# Patient Record
Sex: MALE | Race: WHITE
[De-identification: names, ages, dates, MRNs, and addresses within clinical notes are randomized per-mention and may not be internally consistent; named-entity substitution may affect disease eponyms.]

---

## 2020-08-21 ENCOUNTER — HOSPITAL ENCOUNTER (EMERGENCY)
Dept: HOSPITAL 74 - JERFT | Age: 65
Discharge: HOME | End: 2020-08-21
Payer: SELF-PAY

## 2020-08-21 VITALS — BODY MASS INDEX: 26.9 KG/M2

## 2020-08-21 VITALS — TEMPERATURE: 98.4 F | DIASTOLIC BLOOD PRESSURE: 91 MMHG | SYSTOLIC BLOOD PRESSURE: 147 MMHG | HEART RATE: 74 BPM

## 2020-08-21 DIAGNOSIS — S61.412A: Primary | ICD-10-CM

## 2020-08-21 PROCEDURE — 3E0234Z INTRODUCTION OF SERUM, TOXOID AND VACCINE INTO MUSCLE, PERCUTANEOUS APPROACH: ICD-10-PCS

## 2020-08-21 PROCEDURE — 0HQFXZZ REPAIR RIGHT HAND SKIN, EXTERNAL APPROACH: ICD-10-PCS

## 2020-08-21 NOTE — PDOC
History of Present Illness





- General


Chief Complaint: Laceration


Stated Complaint: RT HAND LACERATION


Time Seen by Provider: 08/21/20 14:58


History Source: Patient


Exam Limitations: Clinical Condition





- History of Present Illness


Initial Comments: 





08/21/20 16:10


Patient with past medical history of hypertension presented with complaint of 

laceration to multiple fingers of right hand status post using a wet tile saw 

and accidentally cutting left fingers.  Patient does not recall last tetanus 

vaccine.  Denies numbness or tingling sensation to the fingers.  Patient 

reported able to use and move all fingers without difficulty.  Denies any other 

symptoms





Timing/Duration: reports: just prior to arrival





Past History





- Medical History


Allergies/Adverse Reactions: 


                                    Allergies











Allergy/AdvReac Type Severity Reaction Status Date / Time


 


No Known Allergies Allergy   Verified 08/21/20 14:53











Home Medications: 


Ambulatory Orders





Amox-Tr/K Cl [Augmentin - 875Mg Tablet] 1 tab PO BID #14 tablet 08/21/20 


Ibuprofen 800 mg PO Q8H PRN #16 tablet 08/21/20 








COPD: No


CHF: No


Dementia: No


Dialysis: No





- Psycho-Social/Smoking History


Smoking History: Never smoked





- Substance Abuse Hx (Audit-C & DAST Scrn)


How often the patient has a drink containing alcohol: Never


Score: In Men: 4 or > Positive; In Women: 3 or > Positive: 0


Screen Result (Pos requires Nsg. Audit-10AR): Negative





**Review of Systems





- Review of Systems


Able to Perform ROS?: Yes


Is the patient limited English proficient: No


Constitutional: No: Chills, Fever, Malaise


HEENTM: No: Symptoms Reported, See HPI, Eye Pain, Blurred Vision, Tearing, 

Recent change in vision, Double Vision, Cataracts, Ear Pain, Ocular Prothesis, E

ar Discharge, Nose Pain, Nose Congestion, Tinnitus, Nose Bleeding, Hearing Loss,

Throat Pain, Throat Swelling, Mouth Pain, Dental Problems, Difficulty 

Swallowing, Mouth Swelling, Other


Respiratory: No: Symptoms reported, See HPI, Cough, Orthopnea, Shortness of 

Breath, SOB with Exertion, SOB at Rest, Stridor, Wheezing, Productive cough, 

Hemoptysis, Other


Cardiac (ROS): No: Symptoms Reported, See HPI, Chest Pain, Edema, Irregular 

Heart Rate, Lightheadedness, Palpitations, Syncope, Chest Tightness, Other


: No: Symptoms Reported


Musculoskeletal: Yes: Symptoms Reported, See HPI, Muscle Pain (right fingers and

hand)


Integumentary: Yes: Symptoms Reported, See HPI, Other (laceration to right hand)


Neurological: No: Tingling, Weakness


All Other Systems: Reviewed and Negative





*Physical Exam





- Vital Signs


                                Last Vital Signs











Temp Pulse Resp BP Pulse Ox


 


 98.4 F   74   18   147/91   99 


 


 08/21/20 14:54  08/21/20 14:54  08/21/20 14:54  08/21/20 14:54  08/21/20 14:54














- Physical Exam





08/21/20 16:19


GENERAL:


Well developed, well nourished. Awake and alert in mild acute distress.


NECK: 


Supple. Full ROM. 


PULMONARY: 


No evidence of respiratory distress. 


MUSCULOSKELETAL 


Normal range of motion at all joints. 


EXTREMITIES: 


 3 cm semilunar deep to subcutaneous tissue laceration to dorsal aspect of MCP 

of right little finger into webspace.  Another 3 cm linear laceration to dorsal 

aspect of proximal phalange of right ring finger with moderate bleeding.  

Another 1.5 superficial laceration to dorsal aspect of proximal phalange of 

right  middle finger with no bleeding.  Full range of motion of fingers.  Normal

sensory to all fingers.  Normal strength to all fingers.


SKIN: 


Warm and dry. Normal capillary refill. laceration to right multiple fingers


NEUROLOGICAL: 


Alert, awake, appropriate.  Gait is normal without ataxia.


PSYCHIATRIC: 


Cooperative. Good eye contact. Appropriate mood


General Appearance: Yes: Nourished, Appropriately Dressed, Apparent Distress, 

Mild Distress





Procedures





- Laceration/Wound Repair


  ** Right Posterior Proximal Dorsal Finger 5th digit


Wound Length: 2.6 to 5.0 cm


Wound Explored: no foreign body present, contaminated


Wound's Depth, Shape: irregular, flap, contused tissue


Irrigated w/ Saline: Yes


Betadine Prep: Yes


Anesthesia: 2% Lidocaine


Amount of Anesthetic (ccs): 2


Wound Repaired With: Sutures


Suture Size/Type: 4:0, nylon


Number of Sutures: 8


Layer Closure: No


Sterile Dressing Applied: Yes


Splint Applied: No


Sling Applied: No





  ** Right Posterior Proximal Dorsal Finger 4th digit


Wound Length: 2.6 to 5.0 cm


Wound Explored: contaminated


Wound's Depth, Shape: linear, contused tissue


Irrigated w/ Saline: Yes


Betadine Prep: Yes


Anesthesia: 2% Lidocaine


Amount of Anesthetic (ccs): 2


Wound Repaired With: Sutures


Suture Size/Type: 4:0, nylon


Number of Sutures: 6


Layer Closure: No


Sterile Dressing Applied: Yes


Splint Applied: No


Sling Applied: No





  ** Right Posterior Proximal Dorsal Finger 3rd digit


Wound Length: to 2.5 cm


Wound Explored: clean


Wound's Depth, Shape: superficial, linear


Irrigated w/ Saline: Yes


Betadine Prep: Yes


Amount of Anesthetic (ccs): 0


Wound Repaired With: Dermabond


Layer Closure: No


Sterile Dressing Applied: Yes


Splint Applied: No


Sling Applied: No


Progress: 





08/21/20 16:32


Wound to right little finger irrigated with normal saline and wound infiltrated 

with 2% 2 cc lidocaine.  Good anesthesia is achieved.  Wound closed with 8 

interrupted 4-0 nylon interrupted sutures.  Wound to right ring finger irrigated

with normal saline and closed with 6 interrupted 4 nylon sutures after wound 

infiltrated with 2% 2 cc lidocaine.  Wound to right middle finger closed with 

Dermabond.  Good hemostasis achieved to all wounds.  Bacitracin applied to all 

wounds and wound covered with adhesive bandage.  Tetanus vaccine given by nurse.

 Patient tolerated procedure well





Medical Decision Making





- Medical Decision Making





08/21/20 16:11


Patient with past medical history of hypertension presented with complaint of 

laceration to multiple fingers of right hand status post using a wet tile saw 

and accidentally cutting right fingers.  Patient does not recall last tetanus 

vaccine.  Denies numbness or tingling sensation to the fingers.  Patient 

reported able to use and move all fingers without difficulty.  Denies any other 

symptoms





Exam significant for 3 cm semilunar deep to subcutaneous tissue laceration to 

dorsal aspect of MCP of right little finger into webspace.  Another 3 cm linear 

laceration to dorsal aspect of proximal phalange of right ring finger with 

moderate bleeding.  Another 1.5 superficial laceration to dorsal aspect of 

proximal phalange of right  middle finger with no bleeding.  Full range of 

motion of fingers.  Normal sensory to all fingers.  Normal strength to all 

fingers.





Wound to right little finger irrigated with normal saline and wound infiltrated 

with 2% 2 cc lidocaine.  Good anesthesia is achieved.  Wound closed with 8 

interrupted 4-0 nylon interrupted sutures.  Wound to right ring finger irrigated

with normal saline and closed with 6 interrupted 4 nylon sutures after wound 

infiltrated with 2% 2 cc lidocaine.  Wound to right middle finger closed with 

Dermabond.  Good hemostasis achieved to all wounds.  Bacitracin applied to all 

wounds and wound covered with adhesive bandage.  Tetanus vaccine given by nurse.

 Patient tolerated procedure well





Patient educated on continues home wound care and stable for discharge on 

Augmentin antibiotics for infection prophylaxis and Motrin.  For pain with 

follow-up in 5 to 7 days for suture removal.  Patient voiced understanding of 

follow-up instructions and patient stable for discharge and left department 

without complication














Discharge





- Discharge Information


Problems reviewed: Yes


Clinical Impression/Diagnosis: 


Laceration of multiple sites of hand and fingers without complication


Qualifiers:


 Encounter type: initial encounter Laterality: left Qualified Code(s): S61.412A 

- Laceration without foreign body of left hand, initial encounter





Condition: Stable


Disposition: HOME





- Admission


No





- Additional Discharge Information


Prescriptions: 


Amox-Tr/K Cl [Augmentin - 875Mg Tablet] 1 tab PO BID #14 tablet


Ibuprofen 800 mg PO Q8H PRN #16 tablet


 PRN Reason: pain





- Follow up/Referral





- Patient Discharge Instructions


Patient Printed Discharge Instructions:  DI for Laceration Repair


Additional Instructions: 


Keep wound clean and dry for the next 24 hours.  Apply bacitracin or Neosporin 

to wound twice a day after 24 hours.  Follow-up in 5 to 7 days back either in 

the ED or with your primary care for suture removal





- Post Discharge Activity


Work/Back to School Note:  Back to Work

## 2020-08-31 ENCOUNTER — HOSPITAL ENCOUNTER (EMERGENCY)
Dept: HOSPITAL 74 - JER | Age: 65
Discharge: HOME | End: 2020-08-31
Payer: COMMERCIAL

## 2020-08-31 VITALS — HEART RATE: 57 BPM | SYSTOLIC BLOOD PRESSURE: 173 MMHG | TEMPERATURE: 98.1 F | DIASTOLIC BLOOD PRESSURE: 87 MMHG

## 2020-08-31 VITALS — BODY MASS INDEX: 22.1 KG/M2

## 2020-08-31 DIAGNOSIS — Z48.02: Primary | ICD-10-CM

## 2020-08-31 NOTE — PDOC
Suture Removal/Wound Check HPI





- History of Present Illness


Stated Complaint: STITCH REMOVAL


Time Seen by Provider: 08/31/20 13:11


History Source: Yes: Patient


Exam Limitations: Yes: No Limitations


Treated at: [facility]


Date of Last ED visit: 08/21/20





- Previous ED Treatment


Type of procedure performed on last visit: Yes: Laceration Repair


Tetanus Immunization: Yes: Given at last ED visit





Past History





- Travel History


Traveled outside of the country in the last 30 days: No


Close contact w/someone who was outside of country & ill: No





- Medical History


Allergies/Adverse Reactions: 


                                    Allergies











Allergy/AdvReac Type Severity Reaction Status Date / Time


 


No Known Allergies Allergy   Verified 08/31/20 13:11











Home Medications: 


Ambulatory Orders





Amox-Tr/K Cl [Augmentin - 875Mg Tablet] 1 tab PO BID #14 tablet 08/21/20 


Ibuprofen 800 mg PO Q8H PRN #16 tablet 08/21/20 








COPD: No


CHF: No


Dementia: No


Dialysis: No





- Psycho-Social/Smoking History


Patient Lives Alone: No


Lives with/in: spouse/SO


Smoking History: Never smoked





Suture Removal/Wound Check PE





- Physical Exam


Laceration/Wound Check Symptoms: reports: None


Current Severity Level: None


Maximum Severity Level: None


Pain Localization: None


Location of Laceration/Wound: right: Finger


Pain Radiation: None





*Review of Systems





- Review of Systems


Able to Perform ROS?: Yes


Constitutional: No: Symptoms Reported


Musculoskeletal: No: Symptoms Reported


Integumentary: No: Symptoms Reported





*Physical Exam





- Physical Exam


General Appearance: Yes: Nourished, Appropriately Dressed.  No: Apparent 

Distress


Neck: negative: Decreased range of motion


Respiratory/Chest: negative: Respiratory Distress


Gastrointestinal/Abdominal: negative: Distended


Extremity: positive: Normal Inspection


Integumentary: positive: Normal Color, Warm, Moist


Neurologic: positive: Motor Strength 5/5 (ambulatory)





Medical Decision Making





- Medical Decision Making





08/31/20 13:13


CC: here for suture removal, no complaints, utd tdap


Exam: rt 4th digit injury with intact dry sutures


Plan: removed form 4th and 5th digit sutures without difficulty





Discharge





- Discharge Information


Problems reviewed: Yes


Clinical Impression/Diagnosis: 


 Encounter for removal of sutures





Condition: Good


Disposition: HOME





- Follow up/Referral





- Patient Discharge Instructions


Patient Printed Discharge Instructions:  DI for Suture Removal





- Post Discharge Activity